# Patient Record
Sex: MALE | Race: WHITE | NOT HISPANIC OR LATINO | Employment: FULL TIME
[De-identification: names, ages, dates, MRNs, and addresses within clinical notes are randomized per-mention and may not be internally consistent; named-entity substitution may affect disease eponyms.]

---

## 2017-09-08 ENCOUNTER — RECORDS - HEALTHEAST (OUTPATIENT)
Dept: ADMINISTRATIVE | Facility: OTHER | Age: 52
End: 2017-09-08

## 2017-10-06 ENCOUNTER — RECORDS - HEALTHEAST (OUTPATIENT)
Dept: ADMINISTRATIVE | Facility: OTHER | Age: 52
End: 2017-10-06

## 2017-10-16 ENCOUNTER — AMBULATORY - HEALTHEAST (OUTPATIENT)
Dept: VASCULAR SURGERY | Facility: CLINIC | Age: 52
End: 2017-10-16

## 2017-10-16 DIAGNOSIS — I99.8 VASCULAR INSUFFICIENCY: ICD-10-CM

## 2023-06-21 ENCOUNTER — TELEPHONE (OUTPATIENT)
Dept: ORTHOPEDICS | Facility: CLINIC | Age: 58
End: 2023-06-21
Payer: COMMERCIAL

## 2023-06-21 NOTE — TELEPHONE ENCOUNTER
[facility] Call Center    Phone Message    May a detailed message be left on voicemail: yes     Reason for Call: Other: Pt had seen Dr Alejandro 5-6 years ago . He had a metal plate put in his leg approx 40 yrs ago and now he is having pain in that leg. Schedule does not show avail anytime soon for Dr Alejandro . Please advise on best options      Action Taken: Other: ortho     Travel Screening: Not Applicable

## 2023-06-21 NOTE — TELEPHONE ENCOUNTER
I called the patient to offer them an appointment with Dr. Alejandro at 1:30pm on 06/22/2023. The patient accepted the appointment. I gave the patient directions to the clinic.    Barrett Merrill, EMT

## 2023-06-22 ENCOUNTER — LAB (OUTPATIENT)
Dept: LAB | Facility: CLINIC | Age: 58
End: 2023-06-22
Payer: COMMERCIAL

## 2023-06-22 ENCOUNTER — OFFICE VISIT (OUTPATIENT)
Dept: ORTHOPEDICS | Facility: CLINIC | Age: 58
End: 2023-06-22
Payer: COMMERCIAL

## 2023-06-22 ENCOUNTER — ANCILLARY PROCEDURE (OUTPATIENT)
Dept: GENERAL RADIOLOGY | Facility: CLINIC | Age: 58
End: 2023-06-22
Attending: ORTHOPAEDIC SURGERY
Payer: COMMERCIAL

## 2023-06-22 DIAGNOSIS — C41.9 OSTEOSARCOMA (H): ICD-10-CM

## 2023-06-22 DIAGNOSIS — C41.9 OSTEOSARCOMA (H): Primary | ICD-10-CM

## 2023-06-22 DIAGNOSIS — C49.9 SARCOMA OF SOFT TISSUE (H): ICD-10-CM

## 2023-06-22 LAB
CRP SERPL-MCNC: <3 MG/L
ERYTHROCYTE [SEDIMENTATION RATE] IN BLOOD BY WESTERGREN METHOD: 2 MM/HR (ref 0–20)

## 2023-06-22 PROCEDURE — 73552 X-RAY EXAM OF FEMUR 2/>: CPT | Mod: RT | Performed by: RADIOLOGY

## 2023-06-22 PROCEDURE — 99203 OFFICE O/P NEW LOW 30 MIN: CPT | Performed by: ORTHOPAEDIC SURGERY

## 2023-06-22 PROCEDURE — 86140 C-REACTIVE PROTEIN: CPT | Performed by: PATHOLOGY

## 2023-06-22 PROCEDURE — 85652 RBC SED RATE AUTOMATED: CPT | Performed by: PATHOLOGY

## 2023-06-22 PROCEDURE — 36415 COLL VENOUS BLD VENIPUNCTURE: CPT | Performed by: PATHOLOGY

## 2023-06-22 PROCEDURE — 73560 X-RAY EXAM OF KNEE 1 OR 2: CPT | Mod: RT | Performed by: RADIOLOGY

## 2023-06-22 RX ORDER — PROPRANOLOL HYDROCHLORIDE 10 MG/1
10 TABLET ORAL
COMMUNITY
Start: 2023-03-17

## 2023-06-22 RX ORDER — LANOLIN ALCOHOL/MO/W.PET/CERES
CREAM (GRAM) TOPICAL
COMMUNITY
Start: 2023-01-31

## 2023-06-22 RX ORDER — FLUOCINONIDE TOPICAL SOLUTION USP, 0.05% 0.5 MG/ML
SOLUTION TOPICAL
COMMUNITY
Start: 2023-03-17

## 2023-06-22 RX ORDER — CALCIUM ACETATE 667 MG/1
TABLET ORAL
COMMUNITY

## 2023-06-22 RX ORDER — DESVENLAFAXINE 25 MG/1
TABLET, EXTENDED RELEASE ORAL
COMMUNITY
Start: 2023-04-23

## 2023-06-22 RX ORDER — POTASSIUM CHLORIDE 1500 MG/1
TABLET, EXTENDED RELEASE ORAL
COMMUNITY
Start: 2023-06-11

## 2023-06-22 NOTE — PATIENT INSTRUCTIONS
Impression: right knee pain. Etiology unknown.  Left 4 th toe pain. Possible residual infection.    Plan: Crp and ESR today  Over the counter antiinflammatories for knee pain.  Increase cane use.  If problem persists for 3-4 weeks then bone scan to exclude loosening.

## 2023-06-22 NOTE — PROGRESS NOTES
Diagnosis: 1.  Osteosarcoma right distal femur.  2.  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    I am reported the last week while leaning over he experienced acute lateral and anterior right knee pain.  The surgeon pain involved but he has had episodes of unpredictable discomfort like that since the incident.  Pain is not constant it does not sound like a radicular pain that is localized to the anterolateral aspect of the right knee.    On a potentially 6 separate issue he has been seen providers for evaluation of discomfort in the left fourth toe.  He reports swelling and erythema.  He was treated with Keflex which has helped but he does describe some residual discomfort.  He does report that he has had an x-ray.    On physical exam there is no evidence of recurrent tumor in the right thigh or leg.  He has a clinical arthrodesis.  There is no area of palpable tenderness along the hamstrings tendons or IT band.  There is no sign of infection or swelling.    X-rays were taken which to my review show excellent placement of the implants with no obvious loosening.    Impression: right knee pain. Etiology unknown.  Left 4 th toe pain. Possible residual infection.    Plan: Crp and ESR today  Over the counter antiinflammatories for knee pain.  Increase cane use.  If problem persists for 3-4 weeks then bone scan to exclude loosening.    Addendum: Inflammatory labs, CRP and ESR were normal.    Patient was seen today as a new patient because of the duration between his prior visit and today and the nature of developing a new problem.

## 2023-06-22 NOTE — NURSING NOTE
Chief Complaint   Patient presents with     RECHECK     Followup right leg, last seen 2014 // pt reports right leg pain but no recent falls        57 year old  1965         Pain Assessment  Patient Currently in Pain: Yes  0-10 Pain Scale: 1 (can get up to 8)  Primary Pain Location: Knee (right knee/leg)           [facility] [address], [address]        Allergies   Allergen Reactions     Mirtazapine      Other reaction(s): Edema     Reglan [Metoclopramide]      Sulfa Antibiotics Hives     Venlafaxine      Other reaction(s): Diaphoresis     Bupropion Anxiety     Other reaction(s): Chest Pain, GI Upset     Escitalopram Anxiety     Other reaction(s): Sleep Disturbances     Paroxetine Anxiety     Skin crawled; out of control             Current Outpatient Medications   Medication     calcium carbonate (OS-BARBI 500 MG [place]. [place]) 500 MG tablet     desvenlafaxine succinate (PRISTIQ) 25 MG 24 hr tablet     fluocinonide (LIDEX) 0.05 % external solution     lisinopril (LISINOPRIL PO)     MAGNESIUM OXIDE 400 (240 Mg) MG tablet     potassium chloride ER (K-TAB) 20 MEQ CR tablet     propranolol (INDERAL) 10 MG tablet     Vitamin D, Cholecalciferol, 400 UNIT CHEW     zolpidem (AMBIEN) 10 MG tablet     calcium acetate (CALPHRON) 667 MG TABS tablet     cycloSPORINE (RESTASIS) 0.05 % ophthalmic emulsion     IBUPROFEN PO     MAGNESIUM ACETATE     Multiple Vitamins-Minerals (MULTIVITAL PO)     ORDER FOR DME     No current facility-administered medications for this visit.

## 2023-06-22 NOTE — LETTER
6/22/2023         RE: Reuben Auguste  [address] [facility] [address]        Dear Colleague,    Thank you for referring your patient, Reuben Auguste, to the [facility]. Please see a copy of my visit note below.    Diagnosis: 1.  Osteosarcoma right distal femur.  2.  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    I am reported the last week while leaning over he experienced acute lateral and anterior right knee pain.  The surgeon pain involved but he has had episodes of unpredictable discomfort like that since the incident.  Pain is not constant it does not sound like a radicular pain that is localized to the anterolateral aspect of the right knee.    On a potentially 6 separate issue he has been seen providers for evaluation of discomfort in the left fourth toe.  He reports swelling and erythema.  He was treated with Keflex which has helped but he does describe some residual discomfort.  He does report that he has had an x-ray.    On physical exam there is no evidence of recurrent tumor in the right thigh or leg.  He has a clinical arthrodesis.  There is no area of palpable tenderness along the hamstrings tendons or IT band.  There is no sign of infection or swelling.    X-rays were taken which to my review show excellent placement of the implants with no obvious loosening.    Impression: right knee pain. Etiology unknown.  Left 4 th toe pain. Possible residual infection.    Plan: Crp and ESR today  Over the counter antiinflammatories for knee pain.  Increase cane use.  If problem persists for 3-4 weeks then bone scan to exclude loosening.    Addendum: Inflammatory labs, CRP and ESR were normal.    Patient was seen today as a new patient because of the duration between his prior visit and today and the nature of developing a new problem.        Alan Alejandro MD

## 2024-05-11 ENCOUNTER — HEALTH MAINTENANCE LETTER (OUTPATIENT)
Age: 59
End: 2024-05-11

## 2025-05-17 ENCOUNTER — HEALTH MAINTENANCE LETTER (OUTPATIENT)
Age: 60
End: 2025-05-17